# Patient Record
Sex: FEMALE | Race: WHITE | Employment: UNEMPLOYED | URBAN - METROPOLITAN AREA
[De-identification: names, ages, dates, MRNs, and addresses within clinical notes are randomized per-mention and may not be internally consistent; named-entity substitution may affect disease eponyms.]

---

## 2020-03-02 ENCOUNTER — NURSE ONLY (OUTPATIENT)
Dept: PRIMARY CARE CLINIC | Age: 25
End: 2020-03-02

## 2020-03-02 ENCOUNTER — HOSPITAL ENCOUNTER (OUTPATIENT)
Age: 25
Discharge: HOME | End: 2020-03-04
Payer: COMMERCIAL

## 2020-03-02 PROCEDURE — 86762 RUBELLA ANTIBODY: CPT

## 2020-03-02 PROCEDURE — 86706 HEP B SURFACE ANTIBODY: CPT

## 2020-03-02 PROCEDURE — 86580 TB INTRADERMAL TEST: CPT | Performed by: NURSE PRACTITIONER

## 2020-03-02 PROCEDURE — 86765 RUBEOLA ANTIBODY: CPT

## 2020-03-02 PROCEDURE — 86735 MUMPS ANTIBODY: CPT

## 2020-03-03 LAB — HBV SURFACE AB TITR SER: NORMAL {TITER}

## 2020-03-04 LAB
INDURATION: NORMAL
TB SKIN TEST: NORMAL

## 2020-03-05 LAB
MEASLES IMMUNE (IGG): NORMAL
MUMPS AB IGG: NORMAL
RUBELLA ANTIBODY IGG: NORMAL

## 2020-05-23 ENCOUNTER — APPOINTMENT (OUTPATIENT)
Dept: CT IMAGING | Age: 25
End: 2020-05-23
Payer: COMMERCIAL

## 2020-05-23 ENCOUNTER — HOSPITAL ENCOUNTER (EMERGENCY)
Age: 25
Discharge: HOME | End: 2020-05-23
Attending: FAMILY MEDICINE
Payer: COMMERCIAL

## 2020-05-23 VITALS
BODY MASS INDEX: 23.9 KG/M2 | SYSTOLIC BLOOD PRESSURE: 114 MMHG | HEART RATE: 76 BPM | TEMPERATURE: 97.1 F | WEIGHT: 140 LBS | RESPIRATION RATE: 16 BRPM | DIASTOLIC BLOOD PRESSURE: 74 MMHG | HEIGHT: 64 IN | OXYGEN SATURATION: 99 %

## 2020-05-23 LAB
ALBUMIN SERPL-MCNC: 4.2 G/DL (ref 3.5–5.2)
ALP BLD-CCNC: 79 U/L (ref 35–104)
ALT SERPL-CCNC: 11 U/L (ref 0–32)
ANION GAP SERPL CALCULATED.3IONS-SCNC: 11 MMOL/L (ref 7–16)
AST SERPL-CCNC: 15 U/L (ref 0–31)
BACTERIA: ABNORMAL /HPF
BASOPHILS ABSOLUTE: 0.03 E9/L (ref 0–0.2)
BASOPHILS RELATIVE PERCENT: 0.4 % (ref 0–2)
BILIRUB SERPL-MCNC: 0.4 MG/DL (ref 0–1.2)
BILIRUBIN URINE: NEGATIVE
BLOOD, URINE: NORMAL
BUN BLDV-MCNC: 10 MG/DL (ref 6–20)
CALCIUM SERPL-MCNC: 9.2 MG/DL (ref 8.6–10.2)
CHLORIDE BLD-SCNC: 100 MMOL/L (ref 98–107)
CLARITY: NORMAL
CO2: 26 MMOL/L (ref 22–29)
COLOR: YELLOW
CREAT SERPL-MCNC: 0.9 MG/DL (ref 0.5–1)
EOSINOPHILS ABSOLUTE: 0.1 E9/L (ref 0.05–0.5)
EOSINOPHILS RELATIVE PERCENT: 1.2 % (ref 0–6)
GFR AFRICAN AMERICAN: >60
GFR NON-AFRICAN AMERICAN: >60 ML/MIN/1.73
GLUCOSE BLD-MCNC: 97 MG/DL (ref 74–99)
GLUCOSE URINE: NEGATIVE MG/DL
HCG(URINE) PREGNANCY TEST: NEGATIVE
HCT VFR BLD CALC: 38.8 % (ref 34–48)
HEMOGLOBIN: 12.9 G/DL (ref 11.5–15.5)
IMMATURE GRANULOCYTES #: 0.02 E9/L
IMMATURE GRANULOCYTES %: 0.2 % (ref 0–5)
KETONES, URINE: NEGATIVE MG/DL
LACTIC ACID: 1.6 MMOL/L (ref 0.5–2.2)
LEUKOCYTE ESTERASE, URINE: NEGATIVE
LYMPHOCYTES ABSOLUTE: 1.94 E9/L (ref 1.5–4)
LYMPHOCYTES RELATIVE PERCENT: 23.1 % (ref 20–42)
MCH RBC QN AUTO: 30.1 PG (ref 26–35)
MCHC RBC AUTO-ENTMCNC: 33.2 % (ref 32–34.5)
MCV RBC AUTO: 90.7 FL (ref 80–99.9)
MONOCYTES ABSOLUTE: 0.74 E9/L (ref 0.1–0.95)
MONOCYTES RELATIVE PERCENT: 8.8 % (ref 2–12)
NEUTROPHILS ABSOLUTE: 5.56 E9/L (ref 1.8–7.3)
NEUTROPHILS RELATIVE PERCENT: 66.3 % (ref 43–80)
NITRITE, URINE: NEGATIVE
PDW BLD-RTO: 12.1 FL (ref 11.5–15)
PH UA: 5.5 (ref 5–9)
PLATELET # BLD: 246 E9/L (ref 130–450)
PMV BLD AUTO: 10.8 FL (ref 7–12)
POTASSIUM SERPL-SCNC: 4.4 MMOL/L (ref 3.5–5)
PROTEIN UA: NEGATIVE MG/DL
RBC # BLD: 4.28 E12/L (ref 3.5–5.5)
RBC UA: ABNORMAL /HPF (ref 0–2)
SODIUM BLD-SCNC: 137 MMOL/L (ref 132–146)
SPECIFIC GRAVITY UA: 1.02 (ref 1–1.03)
TOTAL PROTEIN: 7.4 G/DL (ref 6.4–8.3)
UROBILINOGEN, URINE: 0.2 E.U./DL
WBC # BLD: 8.4 E9/L (ref 4.5–11.5)
WBC UA: ABNORMAL /HPF (ref 0–5)

## 2020-05-23 PROCEDURE — 85025 COMPLETE CBC W/AUTO DIFF WBC: CPT

## 2020-05-23 PROCEDURE — 6360000002 HC RX W HCPCS: Performed by: FAMILY MEDICINE

## 2020-05-23 PROCEDURE — 83605 ASSAY OF LACTIC ACID: CPT

## 2020-05-23 PROCEDURE — 80053 COMPREHEN METABOLIC PANEL: CPT

## 2020-05-23 PROCEDURE — 87088 URINE BACTERIA CULTURE: CPT

## 2020-05-23 PROCEDURE — 81001 URINALYSIS AUTO W/SCOPE: CPT

## 2020-05-23 PROCEDURE — 36415 COLL VENOUS BLD VENIPUNCTURE: CPT

## 2020-05-23 PROCEDURE — 2580000003 HC RX 258: Performed by: FAMILY MEDICINE

## 2020-05-23 PROCEDURE — 96375 TX/PRO/DX INJ NEW DRUG ADDON: CPT

## 2020-05-23 PROCEDURE — 96374 THER/PROPH/DIAG INJ IV PUSH: CPT

## 2020-05-23 PROCEDURE — 74176 CT ABD & PELVIS W/O CONTRAST: CPT

## 2020-05-23 PROCEDURE — 81025 URINE PREGNANCY TEST: CPT

## 2020-05-23 PROCEDURE — 99284 EMERGENCY DEPT VISIT MOD MDM: CPT

## 2020-05-23 RX ORDER — ONDANSETRON 2 MG/ML
4 INJECTION INTRAMUSCULAR; INTRAVENOUS ONCE
Status: COMPLETED | OUTPATIENT
Start: 2020-05-23 | End: 2020-05-23

## 2020-05-23 RX ORDER — 0.9 % SODIUM CHLORIDE 0.9 %
1000 INTRAVENOUS SOLUTION INTRAVENOUS ONCE
Status: COMPLETED | OUTPATIENT
Start: 2020-05-23 | End: 2020-05-23

## 2020-05-23 RX ORDER — KETOROLAC TROMETHAMINE 30 MG/ML
30 INJECTION, SOLUTION INTRAMUSCULAR; INTRAVENOUS ONCE
Status: COMPLETED | OUTPATIENT
Start: 2020-05-23 | End: 2020-05-23

## 2020-05-23 RX ORDER — DROSPIRENONE AND ETHINYL ESTRADIOL 0.03MG-3MG
1 KIT ORAL DAILY
COMMUNITY

## 2020-05-23 RX ORDER — NITROFURANTOIN MACROCRYSTALS 100 MG/1
100 CAPSULE ORAL 4 TIMES DAILY
COMMUNITY
End: 2020-11-05

## 2020-05-23 RX ADMIN — SODIUM CHLORIDE 1000 ML: 9 INJECTION, SOLUTION INTRAVENOUS at 14:14

## 2020-05-23 RX ADMIN — KETOROLAC TROMETHAMINE 30 MG: 30 INJECTION, SOLUTION INTRAMUSCULAR at 14:16

## 2020-05-23 RX ADMIN — ONDANSETRON 4 MG: 2 INJECTION INTRAMUSCULAR; INTRAVENOUS at 14:15

## 2020-05-23 ASSESSMENT — PAIN DESCRIPTION - PAIN TYPE: TYPE: ACUTE PAIN

## 2020-05-23 ASSESSMENT — PAIN DESCRIPTION - ORIENTATION: ORIENTATION: LEFT

## 2020-05-23 ASSESSMENT — PAIN - FUNCTIONAL ASSESSMENT: PAIN_FUNCTIONAL_ASSESSMENT: PREVENTS OR INTERFERES SOME ACTIVE ACTIVITIES AND ADLS

## 2020-05-23 ASSESSMENT — PAIN DESCRIPTION - FREQUENCY: FREQUENCY: INTERMITTENT

## 2020-05-23 ASSESSMENT — PAIN DESCRIPTION - LOCATION: LOCATION: FLANK

## 2020-05-23 ASSESSMENT — PAIN SCALES - GENERAL
PAINLEVEL_OUTOF10: 5
PAINLEVEL_OUTOF10: 4

## 2020-05-23 ASSESSMENT — PAIN DESCRIPTION - DESCRIPTORS: DESCRIPTORS: ACHING

## 2020-05-23 NOTE — ED PROVIDER NOTES
Monocytes Absolute 0.74 0.10 - 0.95 E9/L    Eosinophils Absolute 0.10 0.05 - 0.50 E9/L    Basophils Absolute 0.03 0.00 - 0.20 E9/L   Comprehensive Metabolic Panel   Result Value Ref Range    Sodium 137 132 - 146 mmol/L    Potassium 4.4 3.5 - 5.0 mmol/L    Chloride 100 98 - 107 mmol/L    CO2 26 22 - 29 mmol/L    Anion Gap 11 7 - 16 mmol/L    Glucose 97 74 - 99 mg/dL    BUN 10 6 - 20 mg/dL    CREATININE 0.9 0.5 - 1.0 mg/dL    GFR Non-African American >60 >=60 mL/min/1.73    GFR African American >60     Calcium 9.2 8.6 - 10.2 mg/dL    Total Protein 7.4 6.4 - 8.3 g/dL    Alb 4.2 3.5 - 5.2 g/dL    Total Bilirubin 0.4 0.0 - 1.2 mg/dL    Alkaline Phosphatase 79 35 - 104 U/L    ALT 11 0 - 32 U/L    AST 15 0 - 31 U/L   Lactic Acid, Plasma   Result Value Ref Range    Lactic Acid 1.6 0.5 - 2.2 mmol/L   Microscopic Urinalysis   Result Value Ref Range    WBC, UA 2-5 0 - 5 /HPF    RBC, UA 1-3 0 - 2 /HPF    Bacteria, UA FEW (A) None Seen /HPF       Imaging: All Radiology results interpreted by Radiologist unless otherwise noted. CT ABDOMEN PELVIS WO CONTRAST   Final Result      The patient has a horseshoe kidney in which the left-sided aspect of   the abdomen demonstrates hydronephrosis with left ureter dilatation. No obstructing renal, ureteral, or bladder calculi are identified                                          ED Course / Medical Decision Making     Medications   0.9 % sodium chloride bolus (1,000 mLs Intravenous New Bag 5/23/20 1414)   ketorolac (TORADOL) injection 30 mg (30 mg Intravenous Given 5/23/20 1416)   ondansetron (ZOFRAN) injection 4 mg (4 mg Intravenous Given 5/23/20 1415)        Re-examination:  5/23/20       Time: 3:15   Improving patient felt better after IV fluids    Consult(s):   None    Procedure(s):   none.     MDM:   Patient has history of horseshoe kidney she does have hydronephrosis which may be due to renal stone but she does not have any significant pain at this point she may have passed a

## 2020-05-25 LAB — URINE CULTURE, ROUTINE: NORMAL

## 2020-10-07 ENCOUNTER — HOSPITAL ENCOUNTER (OUTPATIENT)
Age: 25
Discharge: HOME | End: 2020-10-09
Payer: COMMERCIAL

## 2020-10-07 ENCOUNTER — OFFICE VISIT (OUTPATIENT)
Dept: PRIMARY CARE CLINIC | Age: 25
End: 2020-10-07

## 2020-10-07 VITALS
OXYGEN SATURATION: 98 % | BODY MASS INDEX: 24.75 KG/M2 | SYSTOLIC BLOOD PRESSURE: 102 MMHG | WEIGHT: 145 LBS | DIASTOLIC BLOOD PRESSURE: 68 MMHG | HEIGHT: 64 IN | TEMPERATURE: 98.1 F | HEART RATE: 68 BPM

## 2020-10-07 LAB
BILIRUBIN, POC: ABNORMAL
BLOOD URINE, POC: ABNORMAL
CLARITY, POC: CLEAR
COLOR, POC: CLEAR
GLUCOSE URINE, POC: ABNORMAL
KETONES, POC: ABNORMAL
LEUKOCYTE EST, POC: ABNORMAL
NITRITE, POC: ABNORMAL
PH, POC: 7
PROTEIN, POC: ABNORMAL
SPECIFIC GRAVITY, POC: 1.01
UROBILINOGEN, POC: 0.2

## 2020-10-07 PROCEDURE — 87077 CULTURE AEROBIC IDENTIFY: CPT

## 2020-10-07 PROCEDURE — 81002 URINALYSIS NONAUTO W/O SCOPE: CPT | Performed by: NURSE PRACTITIONER

## 2020-10-07 PROCEDURE — 87088 URINE BACTERIA CULTURE: CPT

## 2020-10-07 PROCEDURE — 99213 OFFICE O/P EST LOW 20 MIN: CPT | Performed by: NURSE PRACTITIONER

## 2020-10-07 PROCEDURE — 87186 SC STD MICRODIL/AGAR DIL: CPT

## 2020-10-07 RX ORDER — SULFAMETHOXAZOLE AND TRIMETHOPRIM 800; 160 MG/1; MG/1
1 TABLET ORAL 2 TIMES DAILY
Qty: 14 TABLET | Refills: 0 | Status: SHIPPED | OUTPATIENT
Start: 2020-10-07 | End: 2020-10-14

## 2020-10-07 NOTE — PROGRESS NOTES
2020     Estephania Green 23 y.o. female    : 1995  Chief Complaint:   Dysuria (x 2 days); Lower Back Pain (this morning); and Other (Rt axillary pain)      History of Present Illness   Source of history provided by:  patient. Filiberto Mccormick is a 23 y.o. old female who presents to the [facility] care with complaints of dysuria x 2 days. Reports associated foul odor and burning with urination and suprapubic pressure. Denies gross hematuria. Pertitent associated right flank pain. Denies any fever, chills, vaginal discharge, vaginal bleeding, possibility of pregnancy, vomiting, diarrhea, or lethargy. Patient's last menstrual period was 2020 (approximate). She also reports that she developed a lump in her right axilla that developed 2 days ago. She does report shaving her armpits daily, denies changes in deodorant, laundry detergent or soap. She reports pain with palpation. She has not tried any home remedies. Nothing makes the lump worse or better. Denies redness, swelling, chest pain, SOB, N/V/D, or lethargy. She states her last breast exam was with her ob-gyn in 2020, reports was normal. She denies any lumps in her breasts at this time, denies recent illness. ROS   Past Medical History: History reviewed. No pertinent past medical history. Past Surgical History: History reviewed. No pertinent surgical history. Social History:  reports that she has never smoked. She has never used smokeless tobacco. She reports that she does not drink alcohol or use drugs. Family History: family history is not on file. Allergies: Patient has no known allergies.     Unless otherwise stated in this report the patient's positive and negative responses for review of systems for constitutional, eyes, ENT, cardiovascular, respiratory, gastrointestinal, neurological, , musculoskeletal, and integument systems and related systems to the presenting problem are either stated in the history of present illness or were not pertinent or were negative for the symptoms and/or complaints related to the presenting medical problem. Positives and pertinent negatives as per HPI. All others reviewed and are negative. Physical Exam   VS:   Vitals:    10/07/20 1002   BP: 102/68   Pulse: 68   Temp: 98.1 °F (36.7 °C)   SpO2: 98%   Weight: 145 lb (65.8 kg)   Height: 5' 4\" (1.626 m)     Oxygen Saturation Interpretation: Normal.    Constitutional:  A&Ox3, development consistent with age, NAD. Chest: declines breast exam.  Lungs:  CTAB without wheezing, rales, or rhonchi. Heart:  RRR without pathologic murmurs, rubs, or gallops. Abdomen: Soft, nondistended, with mild suprapubic tenderness. No rebound, rigidity, or guarding. BS+ X4. No organomegaly. Back: No CVA tenderness. Skin:  Normal turgor. Warm, dry, without visible rash, unless noted elsewhere. Neurological:  Alert and oriented. Motor functions intact. Responds to verbal commands. Lab / Imaging Results   All laboratory and radiology results have been personally reviewed by myself. Labs:  Results for orders placed or performed in visit on 10/07/20   POCT Urinalysis no Micro   Result Value Ref Range    Color, UA clear     Clarity, UA clear     Glucose, UA POC neg     Bilirubin, UA neg     Ketones, UA neg     Spec Grav, UA 1.015     Blood, UA POC neg     pH, UA 7.0     Protein, UA POC neg     Urobilinogen, UA 0.2     Leukocytes, UA trace (A)     Nitrite, UA neg        Imaging: All Radiology results interpreted by Radiologist unless otherwise noted. No orders to display       Medical Decision Making:    Patient is well appearing, non toxic and appropriate for outpatient management. Plan is for symptom management and PCP follow up. Assessment / Plan   Alanna Chambers was seen today for dysuria, lower back pain and other.     Diagnoses and all orders for this visit:    Dysuria  -     POCT Urinalysis no Micro    Urinary tract infection without hematuria, site unspecified  -     Culture, Urine; Future  -     sulfamethoxazole-trimethoprim (BACTRIM DS;SEPTRA DS) 800-160 MG per tablet; Take 1 tablet by mouth 2 times daily for 7 days    Lymphadenopathy, axillary  -     sulfamethoxazole-trimethoprim (BACTRIM DS;SEPTRA DS) 800-160 MG per tablet; Take 1 tablet by mouth 2 times daily for 7 days    - Will start Bactrim to cover lymphadenopathy and UTI. If lymphadenopathy does not improve with antibiotics refer to PCP for possible ultrasound. Patient was agreeable to the above plan. - Increase fluids and rest  - Sexual hygiene, including urinating after intercourse  - Increase yogurt consumption/ probiotic use      UA appears postiive for a UTI. Urine C&S pending, will call with results once available. Script written for Bactrim DS, side effects discussed. F/U PCP in 3-5 days if symptoms persist. ED sooner if symptoms worsen or change. ED immediately with the development of fever, shaking chills, body aches, flank pain, vomiting, CP, or SOB. Pt is in agreement with this care plan. All questions answered.      STEVEN Auguste - NP

## 2020-10-09 LAB
ORGANISM: ABNORMAL
URINE CULTURE, ROUTINE: ABNORMAL

## 2020-10-09 NOTE — RESULT ENCOUNTER NOTE
Urine culture grew E. Coli, patient is on appropriate antibiotic, continue as prescribed. Has the lump in her armpit improved at all?

## 2020-11-02 ENCOUNTER — OFFICE VISIT (OUTPATIENT)
Dept: PRIMARY CARE CLINIC | Age: 25
End: 2020-11-02

## 2020-11-02 VITALS
HEIGHT: 64 IN | DIASTOLIC BLOOD PRESSURE: 62 MMHG | HEART RATE: 67 BPM | SYSTOLIC BLOOD PRESSURE: 118 MMHG | OXYGEN SATURATION: 98 % | BODY MASS INDEX: 24.75 KG/M2 | WEIGHT: 145 LBS

## 2020-11-02 DIAGNOSIS — N94.9 VAGINAL BURNING: ICD-10-CM

## 2020-11-02 LAB
BILIRUBIN, POC: NORMAL
BLOOD URINE, POC: NORMAL
CLARITY, POC: CLEAR
COLOR, POC: CLEAR
GLUCOSE URINE, POC: NORMAL
KETONES, POC: NORMAL
LEUKOCYTE EST, POC: NORMAL
NITRITE, POC: NORMAL
PH, POC: 6
PROTEIN, POC: NORMAL
SPECIFIC GRAVITY, POC: 1.01
UROBILINOGEN, POC: 0.2

## 2020-11-02 PROCEDURE — 99213 OFFICE O/P EST LOW 20 MIN: CPT | Performed by: PHYSICIAN ASSISTANT

## 2020-11-02 PROCEDURE — 81002 URINALYSIS NONAUTO W/O SCOPE: CPT | Performed by: PHYSICIAN ASSISTANT

## 2020-11-02 NOTE — PROGRESS NOTES
Chief Complaint:       Dysuria (sx started this AM, tingling sensation) and Vaginal Discharge (tinged yellow, thick, no concern for STD exposure)      History of Present Illness   Source of history provided by: patient. Leia العراقي is a 23 y.o. old female who has a past medical history of: History reviewed. No pertinent past medical history. Presents to the walk in clinic for evaluation of vaginal burning (located externally) which patient just noticed last night. Since onset the symptoms have been persistent. Pt has a history of recurrent UTIs and believes this may be the cause, however she denies any dysuria, hematuria, urinary frequency, hesitancy, etc. She follows with urology for this. Reports occasional thick whitish yellow vaginal discharge but denies any itching or foul odor. Pt reports she has been with the same sexual partner for 3 years and denies any concern for STD exposure. Denies any previous history of STDs. Denies any vaginal lesions, fever, chills, pain with intercourse, N/V/D, back pain, possibility of pregnancy, or lethargy. Patient's last menstrual period was 10/21/2020 (exact date). ROS    Unless otherwise stated in this report or unable to obtain because of the patient's clinical or mental status as evidenced by the medical record, this patients's positive and negative responses for Review of Systems, constitutional, psych, eyes, ENT, cardiovascular, respiratory, gastrointestinal, neurological, genitourinary, musculoskeletal, integument systems and systems related to the presenting problem are either stated in the preceding or were not pertinent or were negative for the symptoms and/or complaints related to the medical problem. Past Medical History: History reviewed. No pertinent surgical history. Social History:  reports that she has never smoked. She has never used smokeless tobacco. She reports that she does not drink alcohol or use drugs.   Family History: family history is not on file. Allergies: Patient has no known allergies. Physical Exam         VS:  /62   Pulse 67   Ht 5' 4\" (1.626 m)   Wt 145 lb (65.8 kg)   LMP 10/21/2020 (Exact Date)   SpO2 98%   BMI 24.89 kg/m²    Oxygen Saturation Interpretation: Normal.    Constitutional:  A&Ox3, development consistent with age, NAD. CV: Heart RRR, no murmurs, rubs, or gallops. Lungs: CTAB without wheezing, rales, or rhonchi  Abdomen:  General Appearance: No rashes, bruising, or abrasions noted. Bowel sounds: BS+x4. Distension:  None. Tenderness: Non-tender without guarding, rebound, or rigidity. Liver/Spleen:  Non-tender and no hepatosplenomegaly. Back: CVA Tenderness: None bilaterally. Pelvic Exam: Deferred per patient request.  Skin:  Normal turgor. Warm, dry, without visible rash, unless noted elsewhere. Neurological:  Orientation age-appropriate. Motor functions intact. Lab / Imaging Results   (All laboratory and radiology results have been personally reviewed by myself)  Labs:  Results for orders placed or performed in visit on 11/02/20   POCT Urinalysis no Micro   Result Value Ref Range    Color, UA clear     Clarity, UA clear     Glucose, UA POC neg     Bilirubin, UA neg     Ketones, UA neg     Spec Grav, UA 1.015     Blood, UA POC neg     pH, UA 6.0     Protein, UA POC neg     Urobilinogen, UA 0.2     Leukocytes, UA neg     Nitrite, UA neg            Assessment / Plan     Impression(s):  Massimo Fiore was seen today for dysuria and vaginal discharge. Diagnoses and all orders for this visit:    Vaginal burning  -     POCT Urinalysis no Micro  -     Culture, Urine; Future        Disposition:  Disposition: Discharged to home. UA came back wnl. Urine culture pending, will call with results once available. Pt advised her symptoms appear to be early in their course and it is difficult to determine the exact etiology based on given history.  She was offered a pelvic exam and/or STD testing today but she declined. Advised to closely monitor for any vesicular or ulcerated vaginal lesions and return to clinic immediately if present. Also advised her symptoms may represent an early yeast infection. She can try topical miconazole OTC for relief. Avoid sexual contact until symptoms resolve. F/u PCP or GYN in 1 week if symptoms persist. ED sooner if symptoms worsen or change. ED immediately with the development of fever, pelvic pain, body aches, shaking chills, severe/worsening abdominal pain, dyspareunia, CP, or SOB. Pt is in agreement with this care plan. All questions answered. Bj Chandra PA-C

## 2020-11-04 LAB — URINE CULTURE, ROUTINE: NORMAL

## 2020-11-05 ENCOUNTER — OFFICE VISIT (OUTPATIENT)
Dept: PRIMARY CARE CLINIC | Age: 25
End: 2020-11-05

## 2020-11-05 VITALS
SYSTOLIC BLOOD PRESSURE: 113 MMHG | OXYGEN SATURATION: 99 % | BODY MASS INDEX: 24.75 KG/M2 | WEIGHT: 145 LBS | HEART RATE: 64 BPM | TEMPERATURE: 97.9 F | DIASTOLIC BLOOD PRESSURE: 78 MMHG | HEIGHT: 64 IN

## 2020-11-05 DIAGNOSIS — N89.8 VAGINAL DISCHARGE: ICD-10-CM

## 2020-11-05 LAB
BILIRUBIN, POC: NORMAL
BLOOD URINE, POC: NORMAL
CLARITY, POC: CLEAR
COLOR, POC: NORMAL
CONTROL: NORMAL
GLUCOSE URINE, POC: NORMAL
KETONES, POC: NORMAL
LEUKOCYTE EST, POC: NORMAL
NITRITE, POC: NORMAL
PH, POC: 7
PREGNANCY TEST URINE, POC: NEGATIVE
PROTEIN, POC: NORMAL
SPECIFIC GRAVITY, POC: 1.02
UROBILINOGEN, POC: 0.2

## 2020-11-05 PROCEDURE — 81025 URINE PREGNANCY TEST: CPT | Performed by: PHYSICIAN ASSISTANT

## 2020-11-05 PROCEDURE — 99214 OFFICE O/P EST MOD 30 MIN: CPT | Performed by: PHYSICIAN ASSISTANT

## 2020-11-05 PROCEDURE — 81002 URINALYSIS NONAUTO W/O SCOPE: CPT | Performed by: PHYSICIAN ASSISTANT

## 2020-11-05 RX ORDER — MICONAZOLE NITRATE 1200MG-2%
KIT VAGINAL
Qty: 1 KIT | Refills: 0 | Status: SHIPPED
Start: 2020-11-05 | End: 2021-03-17

## 2020-11-05 RX ORDER — FLUCONAZOLE 150 MG/1
TABLET ORAL
Qty: 2 TABLET | Refills: 0 | Status: SHIPPED
Start: 2020-11-05 | End: 2021-03-17

## 2020-11-05 NOTE — PROGRESS NOTES
Chief Complaint:       Vaginal Discharge (x 5 days)      History of Present Illness   Source of history provided by: patient. Leia العراقي is a 23 y.o. old female who has a past medical history of: History reviewed. No pertinent past medical history. Presents to the walk in clinic for reevaluation of vaginal burning which has now been present x 5 days. Pt was initially seen in our office by myself on 11/2. UA obtained which came back wnl. Pt was offered a pelvic exam and/or STD testing but she declined at that time. Urine culture subsequently came back negative. She has since had a virtual visit with her PCP yesterday who prescribed her Diflucan. She took one pill last night without relief. She is now complaining of thick white vaginal discharge, vaginal redness, and generalized burning (especially at the urethral opening). Since onset the symptoms have progressively worsened. Denies any abdominal pain, nausea, or additional symptoms. Denies any concern for STD exposure as she has been in a monogamous relationship for the past 3 years. Denies any fever, chills, pain with intercourse, N/V/D, back pain, possibility of pregnancy, or lethargy. Patient's last menstrual period was 10/21/2020 (exact date). Also of note, patient does follow with urology for recurrent UTIs. ROS    Unless otherwise stated in this report or unable to obtain because of the patient's clinical or mental status as evidenced by the medical record, this patients's positive and negative responses for Review of Systems, constitutional, psych, eyes, ENT, cardiovascular, respiratory, gastrointestinal, neurological, genitourinary, musculoskeletal, integument systems and systems related to the presenting problem are either stated in the preceding or were not pertinent or were negative for the symptoms and/or complaints related to the medical problem. Past Medical History: History reviewed. No pertinent surgical history.   Social History: reports that she has never smoked. She has never used smokeless tobacco. She reports that she does not drink alcohol or use drugs. Family History: family history is not on file. Allergies: Patient has no known allergies. Physical Exam         VS:  /78   Pulse 64   Temp 97.9 °F (36.6 °C)   Ht 5' 4\" (1.626 m)   Wt 145 lb (65.8 kg)   LMP 10/21/2020 (Exact Date)   SpO2 99%   BMI 24.89 kg/m²    Oxygen Saturation Interpretation: Normal.    Constitutional:  A&Ox3, development consistent with age, NAD. Abdomen:  General Appearance: No rashes, bruising, or abrasions noted. Bowel sounds: BS+x4. Distension:  None. Tenderness: Non-tender without guarding, rebound, or rigidity. Back: CVA Tenderness: None bilaterally. Pelvic Exam: Chaperone present during examination. External Genitalia: Appears wnl without rashes or lesions. Vagina: Vaginal mucosa pink and mildly erythematous with copious thick white clumpy discharge noted. Cervix: Cervix appears wnl, no CMT noted. Skin:  Normal turgor. Warm, dry, without visible rash, unless noted elsewhere. Neurological:  Orientation age-appropriate. Motor functions intact. Lab / Imaging Results   (All laboratory and radiology results have been personally reviewed by myself)  Labs:  Results for orders placed or performed in visit on 11/05/20   POCT Urinalysis no Micro   Result Value Ref Range    Color, UA light yellow     Clarity, UA clear     Glucose, UA POC neg     Bilirubin, UA neg     Ketones, UA neg     Spec Grav, UA 1.020     Blood, UA POC neg     pH, UA 7.0     Protein, UA POC neg     Urobilinogen, UA 0.2     Leukocytes, UA neg     Nitrite, UA neg    POCT urine pregnancy   Result Value Ref Range    Preg Test, Ur NEGATIVE     Control             Assessment / Plan     Impression(s):  Paige Swann was seen today for vaginal discharge.     Diagnoses and all orders for this visit:    Vulvovaginal candidiasis  -     fluconazole

## 2020-11-09 ENCOUNTER — TELEPHONE (OUTPATIENT)
Dept: PRIMARY CARE CLINIC | Age: 25
End: 2020-11-09

## 2020-11-09 LAB — GENITAL CULTURE, ROUTINE: NORMAL

## 2020-11-09 NOTE — TELEPHONE ENCOUNTER
Pt called after reviewing mychart results for testing done the other day. She was wondering if you had a moment to speak with her about them.

## 2020-11-10 ENCOUNTER — TELEPHONE (OUTPATIENT)
Dept: PRIMARY CARE CLINIC | Age: 25
End: 2020-11-10

## 2020-11-10 ENCOUNTER — OFFICE VISIT (OUTPATIENT)
Dept: PRIMARY CARE CLINIC | Age: 25
End: 2020-11-10

## 2020-11-10 VITALS
WEIGHT: 145 LBS | HEIGHT: 64 IN | BODY MASS INDEX: 24.75 KG/M2 | OXYGEN SATURATION: 97 % | SYSTOLIC BLOOD PRESSURE: 102 MMHG | HEART RATE: 88 BPM | TEMPERATURE: 98.7 F | DIASTOLIC BLOOD PRESSURE: 60 MMHG

## 2020-11-10 DIAGNOSIS — N89.8 VAGINAL LESION: ICD-10-CM

## 2020-11-10 DIAGNOSIS — N94.9 VAGINAL BURNING: ICD-10-CM

## 2020-11-10 DIAGNOSIS — Z11.3 SCREENING EXAMINATION FOR STD (SEXUALLY TRANSMITTED DISEASE): ICD-10-CM

## 2020-11-10 DIAGNOSIS — R39.15 URINARY URGENCY: ICD-10-CM

## 2020-11-10 LAB
BILIRUBIN, POC: NORMAL
BLOOD URINE, POC: NORMAL
CLARITY, POC: CLEAR
COLOR, POC: YELLOW
CONTROL: NORMAL
CULTURE, TRICHOMONAS: NORMAL
GLUCOSE URINE, POC: NORMAL
KETONES, POC: NORMAL
LEUKOCYTE EST, POC: NORMAL
NITRITE, POC: NORMAL
PH, POC: 5.5
PREGNANCY TEST URINE, POC: NORMAL
PROTEIN, POC: NORMAL
S PYO AG THROAT QL: NORMAL
SPECIFIC GRAVITY, POC: >=1.03
UROBILINOGEN, POC: 0.2

## 2020-11-10 PROCEDURE — 99214 OFFICE O/P EST MOD 30 MIN: CPT | Performed by: NURSE PRACTITIONER

## 2020-11-10 PROCEDURE — 81025 URINE PREGNANCY TEST: CPT | Performed by: NURSE PRACTITIONER

## 2020-11-10 PROCEDURE — 81002 URINALYSIS NONAUTO W/O SCOPE: CPT | Performed by: NURSE PRACTITIONER

## 2020-11-10 PROCEDURE — 87880 STREP A ASSAY W/OPTIC: CPT | Performed by: NURSE PRACTITIONER

## 2020-11-10 ASSESSMENT — ENCOUNTER SYMPTOMS
FACIAL SWELLING: 0
ABDOMINAL DISTENTION: 0
CHEST TIGHTNESS: 0
SHORTNESS OF BREATH: 0
RHINORRHEA: 0
VOICE CHANGE: 0
EYES NEGATIVE: 1
BACK PAIN: 0
APNEA: 0
NAUSEA: 0
SORE THROAT: 1
DIARRHEA: 0
WHEEZING: 0
ABDOMINAL PAIN: 1
CONSTIPATION: 0
COLOR CHANGE: 0
VOMITING: 0
COUGH: 0

## 2020-11-10 ASSESSMENT — PATIENT HEALTH QUESTIONNAIRE - PHQ9
SUM OF ALL RESPONSES TO PHQ QUESTIONS 1-9: 0
1. LITTLE INTEREST OR PLEASURE IN DOING THINGS: 0
2. FEELING DOWN, DEPRESSED OR HOPELESS: 0
SUM OF ALL RESPONSES TO PHQ QUESTIONS 1-9: 0
SUM OF ALL RESPONSES TO PHQ9 QUESTIONS 1 & 2: 0
SUM OF ALL RESPONSES TO PHQ QUESTIONS 1-9: 0

## 2020-11-10 NOTE — PROGRESS NOTES
Chief Complaint:   Abdominal Cramping and Yeast Infection    History of Present Illness   Source of history provided by:  patient. Davion Womack is a 23 y.o. old female presenting to the [facility] for vaginal discharge which has been ongoing X 10 days. Since onset the symptoms have been gradually worsening and are moderate in severity. Symptoms are associated with abdominal pain, dysuria, headache and vaginal discharge. She states she was seen in the office on  and was prescribed diflucan and monistat. She has been taking the medication as prescribed with no improvement. The symptoms have worsened over the last day and complains of burning around the urethra and a vaginal lesion on the outer labia. She denies any sexual intercourse over the last 10 days since symptom onset. She denies any concern for STDs, she has been in a monogamous relationship for the past 4 years. Denies any fever, back pain, chills, cloudy urine, constipation, diarrhea, hematuria, sweating, urinary frequency, urinary incontinence, vaginal itching, N/V/D, chance of pregnancy. Patient's last menstrual period was 10/21/2020 (exact date). She also complains of a right axilla lymph node that developed over the past 2 days. She states that it is TTP. She is up to date on her breast exam and denies any palpable breast masses. OB/GYN History: None. STD History: no history of PID, STD's. LMP: Patient's last menstrual period was 10/21/2020 (exact date). Current pregnancy: No.     Birth Control: None. Gravid Status:   0 , Para 0 , Misc/ 0. Review of Systems   Review of Systems   Constitutional: Negative for activity change, appetite change, fatigue, fever and unexpected weight change. HENT: Positive for postnasal drip and sore throat. Negative for congestion, facial swelling, mouth sores, rhinorrhea, sneezing, tinnitus and voice change. Eyes: Negative.     Respiratory: Negative for apnea, cough, chest tightness, shortness of breath and wheezing. Cardiovascular: Negative for chest pain, palpitations and leg swelling. Gastrointestinal: Positive for abdominal pain. Negative for abdominal distention, constipation, diarrhea, nausea and vomiting. Cramping   Endocrine: Negative for cold intolerance and heat intolerance. Genitourinary: Positive for dyspareunia, dysuria, genital sores, urgency, vaginal discharge and vaginal pain. Negative for difficulty urinating, flank pain, frequency and pelvic pain. Musculoskeletal: Negative for back pain, gait problem, joint swelling, myalgias and neck pain. Skin: Negative for color change, pallor, rash and wound. Allergic/Immunologic: Negative for environmental allergies and food allergies. Neurological: Negative for dizziness, tremors, seizures, syncope, facial asymmetry, speech difficulty, weakness, light-headedness, numbness and headaches. Hematological: Positive for adenopathy. Right axilla   Psychiatric/Behavioral: The patient is nervous/anxious. Past Medical History:  has no past medical history on file. Past Surgical History:  has no past surgical history on file. Social History:  reports that she has never smoked. She has never used smokeless tobacco. She reports that she does not drink alcohol or use drugs. Family History: family history is not on file. Allergies: Patient has no known allergies. Physical Exam   Vital Signs:    Vitals:    11/10/20 1211   BP: 102/60   Pulse: 88   Temp: 98.7 °F (37.1 °C)   SpO2: 97%   Weight: 145 lb (65.8 kg)   Height: 5' 4\" (1.626 m)     Oxygen Saturation Interpretation: Normal.    Constitutional/General: Alert and oriented x3, well appearing, non toxic in NAD  Head: Normocephalic and atraumatic. Eyes: PERRL, EOMI  Mouth: Oropharynx clear, minimal erythema with clear post nasal drip , handling secretions.   Neck: Supple, full ROM  Pulmonary: Lungs clear to auscultation bilaterally, no wheezes, rales, or rhonchi. Not in respiratory distress  Cardiovascular:  Regular rate. Regular rhythm. No murmurs, gallops, or rubs. 2+ distal pulses  Chest: no chest wall tenderness  Abdomen: Soft. Non tender. Non distended. +BS. No rebound, guarding, or rigidity. No pulsatile masses appreciated. Musculoskeletal: Moves all extremities x 4. Warm and well perfused. Capillary refill <3 seconds  Skin: warm and dry. No rashes. Hematologic: There is a small palpable, tender lymph node to the right axilla. Neurologic: GCS 15  Psych: Normal Affect  Pelvic exam: normal external genitalia, vagina, uterus and adnexa, VULVA: No masses or tenderness noted. Pustule lesion with no active drainage to the right labia majora proximal to rectum, VAGINA: normal appearing vagina with normal color, no lesions present. CERVIX: friable, erythematous, with cervical discharge present - bloody, milky and white. UTERUS: uterus is normal size, shape, consistency and nontender, ADNEXA: normal adnexa in size, nontender and no masses, RECTAL: rectal exam not indicated, PAP: Pap smear done today. Patient declines breast exam today. Test Results Section   (All laboratory and radiology results have been personally reviewed by myself)  LABS:  Results for orders placed or performed in visit on 11/10/20   POCT urine pregnancy   Result Value Ref Range    Preg Test, Ur neg     Control pass    POCT Urinalysis no Micro   Result Value Ref Range    Color, UA yellow     Clarity, UA clear     Glucose, UA POC neg     Bilirubin, UA neg     Ketones, UA neg     Spec Grav, UA >=1.030     Blood, UA POC neg     pH, UA 5.5     Protein, UA POC neg     Urobilinogen, UA 0.2     Leukocytes, UA neg     Nitrite, UA neg    POCT rapid strep A   Result Value Ref Range    Strep A Ag None Detected None Detected       RADIOLOGY:  Interpreted by Radiologist.    Assessment / Plan     Impression(s):    Marin Wong was seen today for abdominal cramping and yeast infection.     Diagnoses and all orders for this visit:    Vaginal burning  -     POCT urine pregnancy  -     POCT Urinalysis no Micro  -     Culture, Urine; Future  -     External Referral To OB/GYN    Urethritis  -     Cancel: C.trachomatis N.gonorrhoeae DNA, Urine; Future  -     External Referral To OB/GYN    Vaginal lesion  -     Herpes simplex virus (HSV) I/II antibodies IgG & IgM w/ reflex; Future  -     External Referral To OB/GYN    Urinary urgency  -     Cancel: C.trachomatis N.gonorrhoeae DNA, Urine; Future  -     Hepatitis C Antibody; Future  -     Herpes simplex virus (HSV) I/II antibodies IgG & IgM w/ reflex; Future  -     HIV Screen; Future  -     RPR Reflex to Titer and TPPA; Future  -     Culture, Urine; Future  -     External Referral To OB/GYN    Vaginal discharge  -     PAP SMEAR    Screening examination for STD (sexually transmitted disease)  -     Hepatitis C Antibody; Future  -     HIV Screen; Future  -     RPR Reflex to Titer and TPPA; Future  -     Culture, Genital; Future    Pap smear for cervical cancer screening    Sore throat  -     POCT rapid strep A  -  Zyrtec 10mg daily  -  Flonase daily  -  Conservative measures discussed  -  Increase fluids and rest    Axillary lymphadenopathy       -  Warm compresses       -  If symptoms do not resolve with acute presenting infection, follow up with PCP for possible ultrasound. UA appears to be negative in office, will send for C&S and call with results. Urine HcG is negative. Discussed the plan of care with the patient. Based on the clinical assessment, patient should continue monistat and diflucan as prescribed. Abstain from sexual intercourse or douching until symptoms resolve. Will do a complete STD panel since genital culture on 11/5/2020 was negative for yeast, gardnerella vaginalis and neisseria gonorrhoeae. Will refer for further work up to ob-gyn. Will hold off on treating with antibiotics until diagnostic testing results.   Patient should follow up with PCP/ob-gyn ASAP. ED sooner if symptoms worsen or change. ED immediately with any fever, severe or worsening back pain, paresthesias, weakness, or GI/ incontinence. Pt states understanding and is in agreement with this care plan. All questions answered.     STEVEN Galloway - NP

## 2020-11-11 LAB
HEPATITIS C ANTIBODY INTERPRETATION: NORMAL
HIV-1 AND HIV-2 ANTIBODIES: NORMAL
RPR: NORMAL

## 2020-11-12 ENCOUNTER — TELEPHONE (OUTPATIENT)
Dept: PRIMARY CARE CLINIC | Age: 25
End: 2020-11-12

## 2020-11-12 LAB
CHLAMYDIA BY THIN PREP: NEGATIVE
N. GONORRHOEAE DNA, THIN PREP: NEGATIVE
SOURCE: NORMAL

## 2020-11-12 RX ORDER — AZITHROMYCIN 500 MG/1
1000 TABLET, FILM COATED ORAL ONCE
Qty: 2 TABLET | Refills: 0 | Status: SHIPPED | OUTPATIENT
Start: 2020-11-12 | End: 2020-11-12

## 2020-11-13 LAB
HERPES TYPE 1/2 IGM COMBINED: 0.56 IV
HERPES TYPE I/II IGG COMBINED: 0.15 IV
URINE CULTURE, ROUTINE: NORMAL

## 2020-11-14 LAB — GENITAL CULTURE, ROUTINE: NORMAL

## 2020-11-25 DIAGNOSIS — R39.89 URETHRAL PAIN: ICD-10-CM

## 2020-11-25 DIAGNOSIS — N89.8 VAGINAL IRRITATION: ICD-10-CM

## 2020-11-25 DIAGNOSIS — N76.0 ACUTE VAGINITIS: ICD-10-CM

## 2020-11-28 LAB — URINE CULTURE, ROUTINE: NORMAL

## 2020-11-29 LAB — WOUND/ABSCESS: NORMAL

## 2021-03-17 ENCOUNTER — OFFICE VISIT (OUTPATIENT)
Dept: PRIMARY CARE CLINIC | Age: 26
End: 2021-03-17

## 2021-03-17 VITALS
HEART RATE: 71 BPM | DIASTOLIC BLOOD PRESSURE: 73 MMHG | HEIGHT: 64 IN | OXYGEN SATURATION: 99 % | WEIGHT: 146.4 LBS | SYSTOLIC BLOOD PRESSURE: 109 MMHG | BODY MASS INDEX: 25 KG/M2

## 2021-03-17 DIAGNOSIS — Z23 NEED FOR HEPATITIS B VACCINATION: ICD-10-CM

## 2021-03-17 DIAGNOSIS — Z23 NEED FOR VARICELLA VACCINE: ICD-10-CM

## 2021-03-17 DIAGNOSIS — Z11.1 PPD SCREENING TEST: ICD-10-CM

## 2021-03-17 DIAGNOSIS — Z23 NEED FOR MMR VACCINE: ICD-10-CM

## 2021-03-17 DIAGNOSIS — Z01.84 IMMUNITY STATUS TESTING: ICD-10-CM

## 2021-03-17 DIAGNOSIS — Z02.0 SCHOOL PHYSICAL EXAM: Primary | ICD-10-CM

## 2021-03-17 PROCEDURE — 86580 TB INTRADERMAL TEST: CPT | Performed by: FAMILY MEDICINE

## 2021-03-17 PROCEDURE — 90746 HEPB VACCINE 3 DOSE ADULT IM: CPT | Performed by: FAMILY MEDICINE

## 2021-03-17 PROCEDURE — 90716 VAR VACCINE LIVE SUBQ: CPT | Performed by: FAMILY MEDICINE

## 2021-03-17 PROCEDURE — 90471 IMMUNIZATION ADMIN: CPT | Performed by: FAMILY MEDICINE

## 2021-03-17 PROCEDURE — 99213 OFFICE O/P EST LOW 20 MIN: CPT | Performed by: FAMILY MEDICINE

## 2021-03-17 PROCEDURE — 90707 MMR VACCINE SC: CPT | Performed by: FAMILY MEDICINE

## 2021-03-17 PROCEDURE — 90472 IMMUNIZATION ADMIN EACH ADD: CPT | Performed by: FAMILY MEDICINE

## 2021-03-17 ASSESSMENT — PATIENT HEALTH QUESTIONNAIRE - PHQ9
SUM OF ALL RESPONSES TO PHQ QUESTIONS 1-9: 0
SUM OF ALL RESPONSES TO PHQ QUESTIONS 1-9: 0
2. FEELING DOWN, DEPRESSED OR HOPELESS: 0
SUM OF ALL RESPONSES TO PHQ QUESTIONS 1-9: 0
1. LITTLE INTEREST OR PLEASURE IN DOING THINGS: 0

## 2021-03-17 ASSESSMENT — ENCOUNTER SYMPTOMS
COUGH: 0
BLOOD IN STOOL: 0
CHEST TIGHTNESS: 0
VOMITING: 0
DIARRHEA: 0
BACK PAIN: 0
WHEEZING: 0
ABDOMINAL PAIN: 0
COLOR CHANGE: 0
CONSTIPATION: 0
EYE REDNESS: 0
NAUSEA: 0
PHOTOPHOBIA: 0
SHORTNESS OF BREATH: 0

## 2021-03-17 NOTE — PROGRESS NOTES
not nervous/anxious and is not hyperactive. All other systems reviewed and are negative. PHYSICAL EXAM  /73   Pulse 71   Ht 5' 4\" (1.626 m)   Wt 146 lb 6.4 oz (66.4 kg)   SpO2 99%   BMI 25.13 kg/m²   Physical Exam  Vitals signs reviewed. Constitutional:       General: She is not in acute distress. Appearance: Normal appearance. She is well-developed. HENT:      Head: Normocephalic and atraumatic. Right Ear: Tympanic membrane, ear canal and external ear normal.      Left Ear: Tympanic membrane, ear canal and external ear normal.      Nose: Nose normal.      Mouth/Throat:      Mouth: Mucous membranes are moist.      Pharynx: Oropharynx is clear. No oropharyngeal exudate or posterior oropharyngeal erythema. Eyes:      General: No scleral icterus. Extraocular Movements: Extraocular movements intact. Conjunctiva/sclera: Conjunctivae normal.      Pupils: Pupils are equal, round, and reactive to light. Neck:      Musculoskeletal: Neck supple. Thyroid: No thyromegaly. Vascular: No JVD. Cardiovascular:      Rate and Rhythm: Normal rate and regular rhythm. Heart sounds: Normal heart sounds. No murmur. No friction rub. No gallop. Pulmonary:      Effort: Pulmonary effort is normal. No respiratory distress. Breath sounds: Normal breath sounds. No stridor. No wheezing, rhonchi or rales. Abdominal:      General: Bowel sounds are normal. There is no distension. Palpations: Abdomen is soft. There is no mass. Tenderness: There is no abdominal tenderness. There is no guarding or rebound. Musculoskeletal: Normal range of motion. General: No tenderness. Right lower leg: No edema. Left lower leg: No edema. Lymphadenopathy:      Cervical: No cervical adenopathy. Skin:     General: Skin is warm and dry. Findings: No rash. Neurological:      General: No focal deficit present.       Mental Status: She is alert and oriented to person, place, and time. Psychiatric:         Mood and Affect: Mood normal.         Behavior: Behavior normal.         Thought Content: Thought content normal.         Judgment: Judgment normal.                             ASSESSMENT/PLAN:     Diagnosis Orders   1. School physical exam     2. Immunity status testing     3. PPD screening test  Mantoux testing   4. Need for hepatitis B vaccination  Hep B Vaccine Adult (ENGERIX-B)   5. Need for MMR vaccine  MMR vaccine subcutaneous   6. Need for varicella vaccine  Varicella vaccine subcutaneous (VARIVAX)     OK for clinicals, see form in media  PPD placed today along with giving MMR and varicella vaccines. Non immune to MMR and varicella was not tested, she would like to just get varicella boosters rather than wait. Hep B given today as well. She will return Friday for PPD read (annual) and get her Tdap and flu vaccines as well. Reviewed age and gender appropriate health screening exams and vaccinations. Advisedpatient regarding importance of keeping up with recommended health maintenance and to schedule as soon as possible if overdue, as this is important in assessing for undiagnosed pathology, especially cancer. Patientverbalizes understanding and agrees. Call or go to ED immediately if symptoms worsen or persist.  No follow-ups on file. Sooner if necessary. Counseled regarding above diagnosis, including possible risks and complications,especially if left uncontrolled. Counseled regarding the possible side effects, risks, benefits and alternatives to treatment; patient and/or guardian verbalizes understanding. Advised patient to call with any newmedication issues. All questions answered.     Apurva Rain MD  3/17/21

## 2021-03-19 ENCOUNTER — NURSE ONLY (OUTPATIENT)
Dept: PRIMARY CARE CLINIC | Age: 26
End: 2021-03-19

## 2021-03-19 DIAGNOSIS — Z23 NEED FOR INFLUENZA VACCINATION: ICD-10-CM

## 2021-03-19 DIAGNOSIS — Z23 NEED FOR TDAP VACCINATION: Primary | ICD-10-CM

## 2021-03-19 LAB
INDURATION: NORMAL
TB SKIN TEST: NORMAL

## 2021-03-19 PROCEDURE — 90715 TDAP VACCINE 7 YRS/> IM: CPT | Performed by: FAMILY MEDICINE

## 2021-03-19 PROCEDURE — 90472 IMMUNIZATION ADMIN EACH ADD: CPT | Performed by: FAMILY MEDICINE

## 2021-03-19 PROCEDURE — 90686 IIV4 VACC NO PRSV 0.5 ML IM: CPT | Performed by: FAMILY MEDICINE

## 2021-03-19 PROCEDURE — 90471 IMMUNIZATION ADMIN: CPT | Performed by: FAMILY MEDICINE

## 2021-04-14 ENCOUNTER — NURSE ONLY (OUTPATIENT)
Dept: PRIMARY CARE CLINIC | Age: 26
End: 2021-04-14

## 2021-04-14 DIAGNOSIS — Z23 NEED FOR MMR VACCINE: ICD-10-CM

## 2021-04-14 DIAGNOSIS — Z23 NEED FOR TDAP VACCINATION: Primary | ICD-10-CM

## 2021-04-14 DIAGNOSIS — Z23 NEED FOR VARICELLA VACCINE: ICD-10-CM

## 2021-04-14 DIAGNOSIS — Z23 NEED FOR HEPATITIS B VACCINATION: ICD-10-CM

## 2021-04-14 PROCEDURE — 90472 IMMUNIZATION ADMIN EACH ADD: CPT | Performed by: FAMILY MEDICINE

## 2021-04-14 PROCEDURE — 90707 MMR VACCINE SC: CPT | Performed by: FAMILY MEDICINE

## 2021-04-14 PROCEDURE — 90746 HEPB VACCINE 3 DOSE ADULT IM: CPT | Performed by: FAMILY MEDICINE

## 2021-04-14 PROCEDURE — 90471 IMMUNIZATION ADMIN: CPT | Performed by: FAMILY MEDICINE

## 2021-04-14 PROCEDURE — 90716 VAR VACCINE LIVE SUBQ: CPT | Performed by: FAMILY MEDICINE

## 2021-09-21 ENCOUNTER — NURSE ONLY (OUTPATIENT)
Dept: PRIMARY CARE CLINIC | Age: 26
End: 2021-09-21

## 2021-09-21 DIAGNOSIS — Z23 IMMUNIZATION DUE: Primary | ICD-10-CM

## 2021-09-21 PROCEDURE — 90471 IMMUNIZATION ADMIN: CPT | Performed by: NURSE PRACTITIONER

## 2021-09-21 PROCEDURE — 90746 HEPB VACCINE 3 DOSE ADULT IM: CPT | Performed by: NURSE PRACTITIONER
